# Patient Record
(demographics unavailable — no encounter records)

---

## 2024-10-07 NOTE — IMAGING
[de-identified] : Examination of the left foot mild dorsal foot swelling, no ecchymosis, no edema.  Skin is intact.  She does have tenderness today around the Lisfranc and around the first and second metatarsal midfoot.  No tenderness over the third, fourth or fifth metatarsal.  No tenderness over the malleoli today.  No tenderness of ankle ligaments.  No tenderness over the Achilles or calcaneus.  Mild discomfort to plantarflexion dorsiflexion, inversion and eversion.  Prior x-rays left foot and left ankle no obvious fracture or dislocation

## 2024-10-07 NOTE — ASSESSMENT
[FreeTextEntry1] : Will continue with CAM Walker boot.  Will put in request for an MRI left foot.  Patient has mild temporary disability, is currently working light duty.  Takes ibuprofen as needed.  Follow-up in our foot and ankle department after MRI is completed.

## 2024-10-07 NOTE — HISTORY OF PRESENT ILLNESS
[de-identified] : 30-year-old female comes in today for a repeat evaluation subsequent encounter after left foot pain and injury that occurred at work on September 13.  Patient works as a .  She is accompanied by her mom today.  She is still wearing the short cam walker boot went back to work light duty, still experiencing pain

## 2024-10-23 NOTE — HISTORY OF PRESENT ILLNESS
[de-identified] : 30-year-old patient comes in see me for her left foot.  She had a workplace injury and was seen by one of our providers in the urgent care who sent her for an MRI for concern of a Lisfranc injury.  Unfortunately she went back to work and had someone stepped on her foot over the same area.  Since then she still had pain.  She localized the pain dorsally over the foot especially over the central rays.  Does not endorse any calf pain chest pain or shortness of breath.

## 2024-10-23 NOTE — DATA REVIEWED
[FreeTextEntry1] : 3 views of the patient's left foot were reviewed by me personally.  I also reviewed the patient's MRI that she had done.  X-rays demonstrate no evidence of fracture dislocation.  MRI demonstrates evidence of intermetatarsal bursitis.

## 2024-10-23 NOTE — PHYSICAL EXAM
[de-identified] : Patient is alert oriented x 3.  Pleasant cooperative.  Examination of her left lower extremity was undertaken.  She is tender over the second and third webspace.  She has no plantar ecchymoses.  Stable midfoot.  Pain with squeeze of the metatarsal heads together.  Neurovascular intact distally.

## 2024-10-23 NOTE — DISCUSSION/SUMMARY
[de-identified] : I discussed the patient's findings with her.  At this time the patient would benefit from rest for 1 more week.  She is utilize the boot.  I will reassess her in 1 week.  Until then she is 100 percent temporarily disabled from her workplace injury.  All questions are answered

## 2024-10-30 NOTE — HISTORY OF PRESENT ILLNESS
[de-identified] : 30-year-old patient here for follow-up of her left foot.  She is still having pain at the intermetatarsal bursa between the second and third toes and third and fourth toes.  She has been wearing a boot.  Does not endorse any calf pain chest pain shortness of breath.

## 2024-10-30 NOTE — PHYSICAL EXAM
[de-identified] : She is tender mostly at the second webspace.  Nontender elsewhere.  Pain with squeezing the metatarsal heads together.  Neurovascular intact.

## 2024-10-30 NOTE — DISCUSSION/SUMMARY
[de-identified] : I recommend slowly weaning off the boot.  We did discuss an injection but the patient would like to defer this at the time being.  Given the nature of her job I would like her to stay out of work for the next 2 weeks then come back and see me.  All questions sought and answered.

## 2024-11-15 NOTE — HISTORY OF PRESENT ILLNESS
[de-identified] : Patient here for follow-up of her left foot.  She is making progress but unfortunately had to resign from her job secondary to them wanting to go back to full duty sooner than she was ready to do so.  She continues to localize some discomfort over the same areas between the second and third and third and fourth toes.

## 2024-11-15 NOTE — DISCUSSION/SUMMARY
[de-identified] : Recommended slow return back to activity.  Continue with anti-inflammatories as needed and wearing supportive shoe wear.  I will see her back in as-needed basis.  All questions answered peer

## 2024-11-15 NOTE — PHYSICAL EXAM
[de-identified] : She is alert oriented x 3.  Pleasant cooperative.  Examination of the left lower extremity undertaken.  Tender at the second and third webspace.  Pain with squeezing the metatarsal heads together but less so than prior exams.  She is neurovascular intact distally.